# Patient Record
Sex: MALE | Race: WHITE
[De-identification: names, ages, dates, MRNs, and addresses within clinical notes are randomized per-mention and may not be internally consistent; named-entity substitution may affect disease eponyms.]

---

## 2017-01-16 ENCOUNTER — HOSPITAL ENCOUNTER (OUTPATIENT)
Dept: HOSPITAL 80 - FSGY | Age: 52
Discharge: HOME | End: 2017-01-16
Attending: ORTHOPAEDIC SURGERY
Payer: COMMERCIAL

## 2017-01-16 DIAGNOSIS — M75.22: ICD-10-CM

## 2017-01-16 DIAGNOSIS — M19.012: ICD-10-CM

## 2017-01-16 DIAGNOSIS — M75.82: ICD-10-CM

## 2017-01-16 DIAGNOSIS — I44.7: ICD-10-CM

## 2017-01-16 DIAGNOSIS — G56.02: ICD-10-CM

## 2017-01-16 DIAGNOSIS — I10: ICD-10-CM

## 2017-01-16 DIAGNOSIS — E11.9: ICD-10-CM

## 2017-01-16 DIAGNOSIS — M25.812: Primary | ICD-10-CM

## 2017-01-16 PROCEDURE — 0MN24ZZ RELEASE LEFT SHOULDER BURSA AND LIGAMENT, PERCUTANEOUS ENDOSCOPIC APPROACH: ICD-10-PCS | Performed by: ORTHOPAEDIC SURGERY

## 2017-01-16 PROCEDURE — C1713 ANCHOR/SCREW BN/BN,TIS/BN: HCPCS

## 2017-01-16 PROCEDURE — 0RBK4ZZ EXCISION OF LEFT SHOULDER JOINT, PERCUTANEOUS ENDOSCOPIC APPROACH: ICD-10-PCS | Performed by: ORTHOPAEDIC SURGERY

## 2017-01-16 PROCEDURE — 0MN64ZZ RELEASE LEFT WRIST BURSA AND LIGAMENT, PERCUTANEOUS ENDOSCOPIC APPROACH: ICD-10-PCS | Performed by: ORTHOPAEDIC SURGERY

## 2017-01-16 NOTE — GOP
[f rep st]



                                                                OPERATIVE REPORT





DATE OF OPERATION:  01/16/2017



SURGEON:  Holli Hernandez MD



ASSISTANT:  Khang Main CFA



ANESTHESIA:  General.



PREOPERATIVE DIAGNOSIS:  Impingement syndrome, biceps tendinitis, AC arthritis left shoulder, and car
pal tunnel syndrome left wrist.



POSTOPERATIVE DIAGNOSIS:  Impingement syndrome, biceps tendinitis, AC arthritis left shoulder, and ca
rpal tunnel syndrome left wrist, with chondral lesion humeral head left shoulder.



PROCEDURE PERFORMED:  Arthroscopy, arthroscopic debridement of superior labral anterior-posterior tea
r, with chondroplasty of the humeral head, debridement of subacromial impingement, subacromial decomp
ression, distal clavicle excision, biceps tenodesis left shoulder, and carpal tunnel release left wri
st.



FINDINGS:  A diagnostic arthroscopy of the left shoulder was performed with the following findings: 

1. The patient had normal articular cartilage on the majority of the glenoid.  There was a small area
 along the inferior aspect of the glenoid where there was some thinning of the cartilage, but no area
 of full-thickness loss.  

2. There was a chondral lesion on the humeral head, again inferiorly, which measured approximately 2 
cm in diameter.  

3. There was a large loose flap of articular cartilage which was debrided with the shaver and the bas
kets.  It was trimmed back to a stable rim.  

4. The remainder of the articular cartilage was normal.  

5. The area on the humeral head was not full thickness.  There was a remaining 1 or 2 mm of cartilage
 in the area of the chondral lesion.  

6. The rotator cuff was then inspected and the subscapularis was normal.  

7. The patient also had a fairly normal supraspinatus and infraspinatus.  There was marked inflammati
on in the shoulder and the rotator cuff.  

8. The labrum was normal inferiorly and posteriorly.  There was a SLAP tear superiorly from the 11 o'
clock to the 1 o'clock position which was debrided.  

9. The biceps tendon also was attenuated.  This was released for later tenodesis.  

10. The scope was then placed in the subacromial space and the patient was noted to have a very tight
 subacromial space with a marked subacromial bursitis.  This subacromial bursa was debrided.  

11. The rotator cuff was inspected from the bursal surface and noted to be intact.  

12. A subacromial decompression was performed, taking about 10 mm from the anterolateral acromion.  T
his decompressed the subacromial space nicely.  

13. The undersurface of the acromion was then smoothed with a rasp.  

14. The AC arthritis was addressed as well.  The distal 1 cm of the clavicle was excised.  There was 
complete loss of the articular cartilage on the end of the clavicle with a large inferior osteophyte.
  

15. The biceps tendon was then retrieved from the bicipital groove and a biceps tenodesis was perform
ed.  A drill hole was placed at the base of the groove and the tendon was inserted and fixated with a
 7 x 23 Bio-Tenodesis screw from [employer].  Excellent fixation of the tendon was obtained.  

16. Our attention was then turned to the carpal tunnel syndrome in the left wrist.  The transverse ca
rpal ligament was released in its entirety from proximal to distal.  The patient did have a significa
nt hourglass deformity of the median nerve and there was significant rebound hyperemia following rele
ase of the transverse carpal ligament.  It was released in its entirety and the median nerve was free
 in its course through the carpal canal following release of the ligament.



ESTIMATED BLOOD LOSS:  Minimal.



DESCRIPTION OF PROCEDURE:  



PROCEDURE:  The patient was taken the operating room, placed in supine position on the operating tabl
e.  Following induction of adequate general inhalation anesthesia, he was turned to a lateral decubit
us position on the beanbag and carefully padded.  His arm and shoulder were then prepped and draped i
n the usual sterile manner.  He received 2 g of IV Ancef.  The arm was placed in 10 pounds of longitu
dinal traction.  The arthroscope was introduced through a posterior portal and the instrumentation th
rough an anterior portal.  A diagnostic arthroscopy was performed with the above-noted findings.  Our
 attention was turned first to the pathology in the shoulder.  The SLAP tear was addressed with the s
haver.  It was debrided and the biceps tendon was released for a later tenodesis.  The synovitis in t
he shoulder was also debrided.  The chondral lesion on the inferior humeral head was then removed wit
h the baskets and the shaver.  It was trimmed back to a stable rim.  Once this was completed, the ins
trumentation was removed from the shoulder and placed in the subacromial space and a lateral working 
portal was created.  A subacromial decompression was performed.  The bur was utilized to perform a dee
bacromial decompression.  The decompression was carried across to the AC joint.  A rasp was then used
 to smooth the undersurface of the acromion.  The subacromial bursa was excised with a shaver.  The i
nstrumentation was removed from the shoulder and a 3 cm incision was made along the distal end of the
 clavicle.  The incision was carried down through the subcutaneous tissue to the AC joint.  The fasci
a overlying the AC joint was incised in a T-fashion and retracted and the distal 1 cm of the clavicle
 was excised.  The wound was irrigated out and the fascia was repaired using 0 Vicryl in interrupted 
fashion, the subcutaneous tissues were closed using 2-0 Vicryl, and the skin was closed using 4-0 Steve
ryl.  Steri-Strips were applied.  The patient was then turned to a beach chair position and a 4 cm in
cision was made along the bicipital groove.  The incision was carried down through the subcutaneous t
issue to the deltoid.  The deltoid was split in line with the incision and the box rim retractor was 
utilized to retract the deltoid.  The bicipital groove was located and the biceps tendon was retrieve
d.  The biceps was prepared for a tenodesis with a FiberLoop.  A drill hole was then placed at the ba
se of the bicipital groove and the tendon was inserted and fixated with a 7 x 23 Bio-Tenodesis screw.
  Excellent fixation was obtained.  The wound was irrigated out and closed using 2-0 Vicryl in the dee
bcutaneous tissue and 4-0 Vicryl in the skin.  Steri-Strips were applied.  Next, our attention was tu
rned to the carpal tunnel.  A sterile tourniquet was used on the forearm.  It was inflated to 250 mmH
g.  Incision was made extending along the thenar crease to the flexion crease of the wrist.  It was c
arried down through the subcutaneous tissue to the transverse carpal ligament.  A curved clamp was pl
aced under the ligament and then sharp dissection was performed through the ligament down to the carp
al canal.  Care was taken to protect the median nerve throughout the procedure.  The transverse carpa
l ligament was released in its entirety from proximal to distal.  The wound was then irrigated out an
d injected with 10 cc of plain 0.5% Marcaine.  The wound was closed using 3-0 Prolene in a horizontal
 mattress fashion.  Sterile dressings and a splint were applied.  The patient tolerated the procedure
 well and there were no complications.  Estimated blood loss minimal.  Final sponge and needle counts
 were correct.  The patient was transported to the recovery room in a good condition.





Job #:  [id number]

## 2017-05-18 ENCOUNTER — HOSPITAL ENCOUNTER (OUTPATIENT)
Dept: HOSPITAL 80 - FCATH | Age: 52
Discharge: HOME | End: 2017-05-18
Attending: INTERNAL MEDICINE
Payer: COMMERCIAL

## 2017-05-18 DIAGNOSIS — M85.819: ICD-10-CM

## 2017-05-18 DIAGNOSIS — I10: ICD-10-CM

## 2017-05-18 DIAGNOSIS — I20.9: Primary | ICD-10-CM

## 2017-05-18 DIAGNOSIS — E11.9: ICD-10-CM

## 2017-05-18 DIAGNOSIS — Z96.652: ICD-10-CM

## 2017-05-18 LAB
% IMMATURE GRANULYOCYTES: 0.4 % (ref 0–1.1)
ABSOLUTE IMMATURE GRANULOCYTES: 0.02 10^3/UL (ref 0–0.1)
ABSOLUTE NRBC COUNT: 0 10^3/UL (ref 0–0.01)
ADD DIFF?: NO
ADD MORPH?: NO
ADD SCAN?: NO
ANION GAP SERPL CALC-SCNC: 14 MEQ/L (ref 8–16)
ATYPICAL LYMPHOCYTE FLAG: 60 (ref 0–99)
CALCIUM SERPL-MCNC: 9.4 MG/DL (ref 8.5–10.4)
CHLORIDE SERPL-SCNC: 104 MEQ/L (ref 97–110)
CHOLEST SERPL-MCNC: 142 MG/DL (ref 140–220)
CHOLEST/HDLC SERPL: 5.26 RATIO (ref 1–4.97)
CO2 SERPL-SCNC: 21 MEQ/L (ref 22–31)
CREAT SERPL-MCNC: 0.9 MG/DL (ref 0.7–1.3)
ERYTHROCYTE [DISTWIDTH] IN BLOOD BY AUTOMATED COUNT: 12.2 % (ref 11.5–15.2)
FRAGMENT RBC FLAG: 0 (ref 0–99)
GFR SERPL CREATININE-BSD FRML MDRD: > 60 ML/MIN/{1.73_M2}
GLUCOSE SERPL-MCNC: 179 MG/DL (ref 70–100)
HCT VFR BLD CALC: 48.4 % (ref 40–51)
HGB BLD-MCNC: 17.3 G/DL (ref 13.7–17.5)
HIGH DENSITY LIPOPROTEIN: 27 MG/DL (ref 40–65)
INR PPP: 1.01 (ref 0.83–1.16)
LDLC/HDLC SERPL: 3.22 RATIO (ref 1–3.64)
LEFT SHIFT FLG: 0 (ref 0–99)
LIPEMIA HEMOLYSIS FLAG: 90 (ref 0–99)
LOW DENSITY LIPOPROTEIN: 87 MG/DL (ref 80–100)
MAGNESIUM SERPL-MCNC: 1.7 MG/DL (ref 1.6–2.3)
MCH RBC BLDCO QN: 30.9 PG (ref 27.9–34.1)
MCHC RBC AUTO-ENTMCNC: 35.7 G/DL (ref 32.4–36.7)
MCV RBC AUTO: 86.6 FL (ref 81.5–99.8)
NON-HIGH DENSITY LIPOPROTEIN: 115 MG/DL (ref 90–129)
NRBC-AUTO%: 0 % (ref 0–0.2)
PLATELET # BLD: 149 10^3/UL (ref 150–400)
PLATELET CLUMPS FLAG: 0 (ref 0–99)
PMV BLD AUTO: 9.5 FL (ref 8.7–11.7)
POTASSIUM SERPL-SCNC: 4.5 MEQ/L (ref 3.5–5.2)
PROTHROMBIN TIME: 13.2 SEC (ref 12–15)
RBC # BLD AUTO: 5.59 10^6/UL (ref 4.4–6.38)
SODIUM SERPL-SCNC: 139 MEQ/L (ref 134–144)
TRIGL SERPL-MCNC: 140 MG/DL (ref 40–150)
VERY LOW DENSITY LIPOPROTEINS: 28 MG/DL (ref 8–25)

## 2017-05-18 PROCEDURE — 4A023N7 MEASUREMENT OF CARDIAC SAMPLING AND PRESSURE, LEFT HEART, PERCUTANEOUS APPROACH: ICD-10-PCS | Performed by: INTERNAL MEDICINE

## 2017-05-18 PROCEDURE — B2111ZZ FLUOROSCOPY OF MULTIPLE CORONARY ARTERIES USING LOW OSMOLAR CONTRAST: ICD-10-PCS | Performed by: INTERNAL MEDICINE

## 2017-05-18 PROCEDURE — B2151ZZ FLUOROSCOPY OF LEFT HEART USING LOW OSMOLAR CONTRAST: ICD-10-PCS | Performed by: INTERNAL MEDICINE

## 2017-05-18 PROCEDURE — C1760 CLOSURE DEV, VASC: HCPCS

## 2017-05-18 NOTE — PDDXCAT
Diagnostic Cath Note





- .


Date: 05/18/17


: Abhi


Indication: Class III or IV angina, which improves to class I/II w medical 

therapy





- Procedure


Access: right groin


Procedure: left heart catheterization, coronary angiography, left ventriculogram





- Materials


Left Heart Cath size: 6F


Left Heart Cath materials: standard multipack (JL4, JR4, pigtail), other (Cook 

needle)





- Findings-Left Heart Catheterization


LM: Short vessel without appreciable luminal irregularities.  Bifurcation into 

the LAD and the LCX.


LAD: Large diameter vessel.  Principal Diag in the early part of the vessel.  

Vessel travels to the apex of the heart.  No luminal irregularities were noted


LCX: Dominant vessel with supply to the PDA region.  There was an early OM take 

off (principal OM) with bifurcation in the proximal portion of the branch 

vessel.  A smaller OM in the mid portion of the LCX was also noted, and equally 

large.  No luminal irregularities were noted.


RCA: Small, diminuative vessel.  No luminal irregularities were noted.


EDP: 12 mm Hg


LVEF: 55%


Wall motion: Normal wall motion


Complications: none


Estimated blood loss: <50ml


Closure method: Angioseal


Assessment: Patient is a 52 y/o male with symptoms concerning for ACS.  Recent 

MPI in early 2017 without ischaemia or infarction patterns noted.  Normal left 

ventricular systolic ejection fraction was noted.


Plan: 





Medical management is recommended for this patient.  Would continue with 

aggressive risk factor modification.


Intervention: 





None





Patient Problems: 


 Problems











Problem Status Onset


 


Arrhythmia Acute  


 


Localized primary osteoarthritis of left lower leg Acute

## 2017-05-18 NOTE — CPEKG
Heart Rate: 101

RR Interval: 594

P-R Interval: 176

QRSD Interval: 102

QT Interval: 336

QTC Interval: 436

P Axis: 43

QRS Axis: -30

T Wave Axis: 61

EKG Severity - ABNORMAL ECG -

EKG Impression: SINUS TACHYCARDIA

EKG Impression: MULTIPLE VENTRICULAR PREMATURE COMPLEXES

EKG Impression: LEFT AXIS DEVIATION

Electronically Signed By: Alaina Adams 18-May-2017 07:18:08

## 2017-11-13 ENCOUNTER — HOSPITAL ENCOUNTER (OUTPATIENT)
Dept: HOSPITAL 80 - FSGY | Age: 52
Discharge: HOME | End: 2017-11-13
Attending: ORTHOPAEDIC SURGERY
Payer: COMMERCIAL

## 2017-11-13 VITALS — OXYGEN SATURATION: 91 % | DIASTOLIC BLOOD PRESSURE: 97 MMHG | SYSTOLIC BLOOD PRESSURE: 136 MMHG

## 2017-11-13 VITALS — HEART RATE: 69 BPM | RESPIRATION RATE: 16 BRPM | TEMPERATURE: 97.9 F

## 2017-11-13 DIAGNOSIS — I10: ICD-10-CM

## 2017-11-13 DIAGNOSIS — M75.21: ICD-10-CM

## 2017-11-13 DIAGNOSIS — E29.1: ICD-10-CM

## 2017-11-13 DIAGNOSIS — Z96.652: ICD-10-CM

## 2017-11-13 DIAGNOSIS — E11.9: ICD-10-CM

## 2017-11-13 DIAGNOSIS — Z48.02: ICD-10-CM

## 2017-11-13 DIAGNOSIS — T81.89XA: ICD-10-CM

## 2017-11-13 DIAGNOSIS — M75.41: Primary | ICD-10-CM

## 2017-11-13 DIAGNOSIS — G56.01: ICD-10-CM

## 2017-11-13 DIAGNOSIS — M75.81: ICD-10-CM

## 2017-11-13 PROCEDURE — 01N50ZZ RELEASE MEDIAN NERVE, OPEN APPROACH: ICD-10-PCS | Performed by: ORTHOPAEDIC SURGERY

## 2017-11-13 PROCEDURE — 0PB94ZZ EXCISION OF RIGHT CLAVICLE, PERCUTANEOUS ENDOSCOPIC APPROACH: ICD-10-PCS | Performed by: ORTHOPAEDIC SURGERY

## 2017-11-13 PROCEDURE — 0MB14ZZ EXCISION OF RIGHT SHOULDER BURSA AND LIGAMENT, PERCUTANEOUS ENDOSCOPIC APPROACH: ICD-10-PCS | Performed by: ORTHOPAEDIC SURGERY

## 2017-11-13 PROCEDURE — 0LM10ZZ REATTACHMENT OF RIGHT SHOULDER TENDON, OPEN APPROACH: ICD-10-PCS | Performed by: ORTHOPAEDIC SURGERY

## 2017-11-13 PROCEDURE — 8E0YXY8 SUTURE REMOVAL FROM LOWER EXTREMITY: ICD-10-PCS | Performed by: ORTHOPAEDIC SURGERY

## 2017-11-13 RX ADMIN — Medication PRN MCG: at 13:54

## 2017-11-13 RX ADMIN — Medication PRN MCG: at 13:43

## 2017-11-13 NOTE — POSTOPPROG
Post Op Note


Date of Operation: 11/13/17


Surgeon: Payton Florian


Assistant: Marilynn Gallegos


Anesthesiologist: Dr. Garcia


Anesthesia: GET(General Endotracheal)


Pre-op Diagnosis: right shoulder pain, right carpal tunnel syndrome, painful 

sutures L knee


Post-op Diagnosis: R shoulder pain, R carpal tunnel syndrome, painful sutures L 

knee


Indication: continued pain


Procedure: R shoulder SAD, DCE, debridment, biceps tenotomy; R CTR; L knee 

suture ronaldo


Inf/Abcess present in the surg proc area at time of surgery?: No


EBL: Minimal


Complications: 





none

## 2017-11-13 NOTE — POSTANESTH
Post Anesthetic Evaluation


Cardiovascular Status: Similar to Pre-Op Cond


Respiratory Status: Similar to Pre-op Cond.


Level of Consciousness/Mental Status: Mildly Sleepy, Arousable


Pain Control: Adequate, Prn Tx Ordered


Nausea/Vomiting Control: Adequate, Prn Tx Ordered


Complications Possibly Related to Anesthesia: None Noted

## 2017-11-13 NOTE — SOAPPROG
SOAP Progress Note


Assessment/Plan: 


Assessment/Plan:





53y/o male s/p right shoulder SAD, DCE, debridement, biceps tenotomy; right 

carpal tunnel release; removal of painful sutures in left knee


- stable and doing well


- sling x 2 weeks, ok for pendulums and straightening elbow


- meds as directed


- keep incisions clean and dry


- call with issues or concerns

















11/13/17 13:54





Subjective: 





Shoulder feels sore, otherwise well


Objective: 





 Vital Signs











Temp Pulse Resp BP Pulse Ox


 


 36.4 C   82   14   140/81 H  94 


 


 11/13/17 13:30  11/13/17 10:05  11/13/17 10:05  11/13/17 10:05  11/13/17 10:05








NAD, well appearing, no distress


EOMi, face symmetric


MAEx4


incisions clean, dressed





ICD10 Worksheet


Patient Problems: 


 Problems











Problem Status Onset


 


Shoulder pain Acute  














- ICD10 Problem Qualifiers


(1) Shoulder pain

## 2017-11-13 NOTE — PDANEPAE
ANE Past Medical History





- Cardiovascular History


Hx Hypertension: No


Hx Arrhythmias: No


Hx Chest Pain: No


Hx Coronary Artery / Peripheral Vascular Disease: No


Hx CHF / Valvular Disease: No


Hx Palpitations: No


Cardiovascular History Comment: RBBB





- Pulmonary History


Hx COPD: No


Hx Asthma/Reactive Airway Disease: No


Hx Recent Upper Respiratory Infection: No


Hx Oxygen in Use at Home: No


Hx Sleep Apnea: No


Sleep Apnea Screening Result - Last Documented: Negative





- Neurologic History


Hx Cerebrovascular Accident: No


Hx Seizures: No


Hx Dementia: No





- Endocrine History


Hx Diabetes: Yes


Obesity: severe


Endocrine History Comment: IDDM





- Renal History


Hx Renal Disorders: No





- Liver History


Hx Hepatic Disorders: No





- Neurological & Psychiatric Hx


Hx Neurological and Psychiatric Disorders: No





- Cancer History


Hx Cancer: No





- Congenital Disorder History


Hx Congenital Disorders: No





- GI History


Hx Gastrointestinal Disorders: No





- Other Health History


Other Health History: INTERMITTENT RT BICEPS STRESS RASH





- Chronic Pain History


Chronic Pain: Yes (RT SHLDR)





- Surgical History


Prior Surgeries: LT SHLDR RTC 1/2017 AT.  Left knee replaced.  Tonsilectomy.  

Vasectomy.  Umbilical hernia.  Right heel bone spur





ANE Review of Systems


Review of Systems: 








- Exercise capacity


METS (RN): 4 METS





ANE Patient History





- Allergies


Allergies/Adverse Reactions: 








exenatide microspheres [From Bydureon] Allergy (Verified 11/08/16 16:09)


 


sulfamethoxazole [From Bactrim] Allergy (Verified 10/10/17 15:33)


 


testosterone [From Testim] Allergy (Verified 10/10/17 15:33)


 Rash


trimethoprim [From Bactrim] Allergy (Verified 10/10/17 15:33)


 FEVER,DECREASED BP








- Home Medications


Home Medications: 








GABAPENTIN 900 mg PO DAILY AT 6PM 11/08/16 [Last Taken Unknown]


Lisinopril 10 mg PO DAILY AT 6PM 11/08/16 [Last Taken Unknown]


Metformin HCl [Metformin 1000 mg] DAILY AT 6PM 11/08/16 [Last Taken Unknown]


Nesina 25 DAILY AT 6PM 11/08/16 [Last Taken Unknown]


Advil Pm Caplet HS 10/10/17 [Last Taken Unknown]


Allergy Medication DAILY AT 8PM 10/10/17 [Last Taken Unknown]


Aspirin DAILY AT 6PM 10/10/17 [Last Taken Unknown]


Basaglar DAILY AT 8PM 10/10/17 [Last Taken Unknown]


Herbal Drugs  10/10/17 [Last Taken Unknown]


traMADol PRN 10/10/17 [Last Taken Unknown]








- NPO status


NPO Since - Liquids (Date): 11/12/17


NPO Since - Liquids (Time): 23:00


NPO Since - Solids (Date): 11/12/17


NPO Since - Solids (Time): 22:30





- Smoking Hx


Smoking Status: Never smoked





- Family Anes Hx


Family Hx Anesthesia Complications: None





ANE Labs/Vital Signs





- Vital Signs


Blood Pressure: 140/81


Heart Rate: 82


Respiratory Rate: 14


O2 Sat (%): 94


Height: 185.42 cm


Weight: 120.202 kg





ANE Physical Exam





- Airway


Neck exam: FROM


Mallampati Score: Class 1


Mouth exam: normal dental/mouth exam





- Pulmonary


Pulmonary: no respiratory distress





- Cardiovascular


Cardiovascular: regular rate and rhythym





- ASA Status


ASA Status: III





ANE Anesthesia Plan


Anesthesia Plan: general endotracheal anesthesia

## 2019-01-17 ENCOUNTER — HOSPITAL ENCOUNTER (OUTPATIENT)
Dept: HOSPITAL 80 - FSGY | Age: 54
Discharge: HOME | End: 2019-01-17
Attending: ORTHOPAEDIC SURGERY
Payer: COMMERCIAL

## 2019-01-17 VITALS — SYSTOLIC BLOOD PRESSURE: 135 MMHG | DIASTOLIC BLOOD PRESSURE: 80 MMHG

## 2019-01-17 DIAGNOSIS — G56.21: Primary | ICD-10-CM

## 2019-01-17 PROCEDURE — 01N40ZZ RELEASE ULNAR NERVE, OPEN APPROACH: ICD-10-PCS | Performed by: ORTHOPAEDIC SURGERY

## 2019-01-17 RX ADMIN — HYDROMORPHONE HYDROCHLORIDE PRN MG: 2 INJECTION INTRAMUSCULAR; INTRAVENOUS; SUBCUTANEOUS at 10:23

## 2019-01-17 RX ADMIN — Medication PRN MCG: at 10:20

## 2019-01-17 RX ADMIN — HYDROMORPHONE HYDROCHLORIDE PRN MG: 2 INJECTION INTRAMUSCULAR; INTRAVENOUS; SUBCUTANEOUS at 10:36

## 2019-01-17 RX ADMIN — Medication PRN MCG: at 10:36

## 2019-01-17 NOTE — SOAPPROG
SOAP Progress Note


Assessment/Plan: 


Assessment:

















Assessment/Plan:


54y/o male s/p right ulnar nerve decompression/transposition


- stable and doing well


- sling and splint at all times x 2 weeks


- dc when PACU criteria met


- call with any issues





01/17/19 10:22





Subjective: 





Elbow pain 6/10. Minimal tingling in 5th digit


Objective: 





 Vital Signs











Temp Pulse Resp BP Pulse Ox


 


 36.5 C   79   15   129/85 H  95 


 


 01/17/19 06:26  01/17/19 07:11  01/17/19 07:11  01/17/19 07:11  01/17/19 07:11








NAD, no distress, waking from anesthesia


EOMi, face symmetric


wiggles all fingers easily on the right


incision clean, dressed





ICD10 Worksheet


Patient Problems: 


 Problems











Problem Status Onset


 


Arrhythmia Acute  


 


Localized primary osteoarthritis of left lower leg Acute  


 


Shoulder pain Acute

## 2019-01-17 NOTE — POSTOPPROG
Post Op Note


Date of Operation: 01/17/19


Surgeon: Payton Florian


Assistant: Marilynn Gallegos PA-C


Anesthesiologist: Dr. Bustos


Anesthesia: GET(General Endotracheal)


Pre-op Diagnosis: right ulnar neuritis


Post-op Diagnosis: right ulnar nerve entrapment


Indication: right ulnar nerve pain


Procedure: right ulnar nerve decompression/transposition


Inf/Abcess present in the surg proc area at time of surgery?: No


EBL: Minimal


Complications: 





none

## 2019-01-17 NOTE — PDANEPAE
ANE Past Medical History





- Cardiovascular History


Hx Hypertension: Yes


Hx Arrhythmias: No


Hx Chest Pain: Yes


Hx Coronary Artery / Peripheral Vascular Disease: No


Hx CHF / Valvular Disease: No


Hx Palpitations: No


Cardiovascular History Comment: Hx LBBB.  Heart cath 5/2017





- Pulmonary History


Hx COPD: No


Hx Asthma/Reactive Airway Disease: No


Hx Recent Upper Respiratory Infection: No


Hx Oxygen in Use at Home: No


Hx Sleep Apnea: No


Sleep Apnea Screening Result - Last Documented: Positive


Pulmonary History Comment: AYDIN triggers





- Neurologic History


Hx Cerebrovascular Accident: No


Hx Seizures: No


Hx Dementia: No





- Endocrine History


Hx Diabetes: Yes


Endocrine History Comment: INSULIN DEP. DX 2013





- Renal History


Hx Renal Disorders: No





- Liver History


Hx Hepatic Disorders: No





- Neurological & Psychiatric Hx


Hx Neurological and Psychiatric Disorders: No





- Cancer History


Hx Cancer: No





- Congenital Disorder History


Hx Congenital Disorders: No





- GI History


Hx Gastrointestinal Disorders: No





- Other Health History


Other Health History: wears glasses for reading.  osteoarthritis - uses 

marijuana for pain control





- Chronic Pain History


Chronic Pain: Yes (kenns, shoulders)





- Surgical History


Prior Surgeries: left shoulder replacement and carpel tunnel 2017.  right 

shoulder clean-up and carpel tunnel 2017.  left total knee 11/15.  LT KNEE 

SCOPE.  REMVL BONE SPUR RT HEEL.  UMBILICAL HERNIA.  TONSILLECTOMY





ANE Review of Systems


Review of Systems: 








- Exercise capacity


METS (RN): 4 METS





ANE Patient History





- Allergies


Allergies/Adverse Reactions: 








dapagliflozin [From Farxiga] Allergy (Verified 01/07/19 11:07)


 stomach pain


exenatide microspheres [From Bydureon] Allergy (Verified 01/07/19 11:07)


 stomach pain


sulfamethoxazole [From Bactrim] Allergy (Verified 01/07/19 11:07)


 fever, hypotension


testosterone [From Testim] Allergy (Verified 10/10/17 15:33)


 Rash


trimethoprim [From Bactrim] Allergy (Verified 01/07/19 11:07)


 fever, hypotension








- Home Medications


Home medications: home medication list seen and reviewed


Home Medications: 








Lisinopril [Zestril 10 mg (*)] 10 mg HS 10/29/15 [Last Taken 01/16/19]


metFORMIN HCL [Glucophage 500 mg (*)] 1,000 mg HS 10/29/15 [Last Taken 01/16/19]


Gabapentin [Neurontin 300 MG (*)] 900 mg HS 11/17/16 [Last Taken 01/16/19]


Alogliptin Benzoate [Nesina] 10 mg HS 01/07/19 [Last Taken 01/16/19]


Aspirin [Aspirin 81mg (*)] 81 mg DAILY 01/07/19 [Last Taken 01/10/19]


Insulin Glargine [Lantus] 40 unit HS 01/07/19 [Last Taken 01/16/19]


Lactobacillus Acidophilus [Probiotic] 1 each PO 01/07/19 [Last Taken 01/10/19]


Multivitamin [One-Daily Multi-Vitamin] 1 each PO 01/07/19 [Last Taken 01/10/19]








- NPO status


NPO Status: no food or drink >8 hours


NPO Since - Liquids (Date): 01/17/19


NPO Since - Liquids (Time): 04:15


NPO Since - Solids (Date): 01/16/19


NPO Since - Solids (Time): 20:00





- Anes Hx


Anes Hx: post operative nausea and vomiting





- Smoking Hx


Smoking Status: Never smoked





- Family Anes Hx


Family Hx Anesthesia Complications: unknown, pt is adopted





ANE Labs/Vital Signs





- Vital Signs


Blood Pressure: 129/85


Heart Rate: 79


Respiratory Rate: 15


O2 Sat (%): 95


Height: 185.42 cm


Weight: 115.666 kg





ANE Physical Exam





- Airway


Neck exam: FROM


Mallampati Score: Class 3





- Pulmonary


Pulmonary: no respiratory distress, no rales or rhonchi, clear to auscultation





- Cardiovascular


Cardiovascular: regular rate and rhythym, no murmur, rub, or gallop





- ASA Status


ASA Status: II





ANE Anesthesia Plan


Anesthesia Plan: general endotracheal anesthesia

## 2019-01-18 NOTE — GOP
DATE OF OPERATION:  01/17/2019



SURGEON:  Payton Florian MD



ASSISTANT:  VAHID Farrar.



ANESTHESIA:  General.



PREOPERATIVE DIAGNOSIS:  Cubital tunnel syndrome, right elbow.



POSTOPERATIVE DIAGNOSIS:  Cubital tunnel syndrome, right elbow.



PROCEDURE PERFORMED:  Decompression of ulnar nerve at the elbow with subcutaneous transposition of th
e ulnar nerve.



FINDINGS:  Preoperative exam and EMG nerve conduction study demonstrated significant cubital tunnel s
yndrome of the right elbow.  At the time of surgery, the ulnar nerve was located in the cubital tunne
l with some difficulty.  The patient had an anomalous triceps muscle which extended down into the cub
ital tunnel.  This was teased back and the ulnar nerve was located just posterior to the medial inter
muscular septum.  The nerve was then traced through its course in the cubital tunnel.  There was sign
ificant compression on the nerve.  There was an hourglass deformity starting at the superior portion 
of the tunnel and extending through the area of compression in the cubital tunnel.  The nerve was mob
ilized and in addition to the vascular bundle that traveled with the nerve, it was transposed medial 
to the medial epicondyle.  The nerve was completely released distally as well as it entered the forea
rm.  Once the nerve was transposed anterior to the medial upper condyle, some 0 Vicryl was used to ta
ck the subcutaneous tissue to the medial epicondyle to hold the nerve in this position.  There was no
 tension on the nerve proximal or distal.





ESTIMATED BLOOD LOSS:  Minimal.



DESCRIPTION OF PROCEDURE:  The patient was taken to the operating room, placed in supine position on 
the operating table.  Following induction of adequate general inhalation anesthesia, the arm and fore
arm were prepped and draped in usual sterile manner.  The patient received 2 g of IV Ancef.  The arm 
was elevated and exsanguinated and the tourniquet inflated to 250 mmHg.  A curvilinear incision was m
anjana posterior to the medial epicondyle.  Incision was carried down through the subcutaneous fat layer
 to the cubital tunnel.  The patient's anatomy was somewhat distorted by the extension of his triceps
 both distally and anteriorly.  After careful dissection, the ulnar nerve was located just posterior 
to the medial intermuscular septum.  It was carefully dissected free in its course through the cubita
l tunnel.  The dissection was carried distally as well.  The nerve was then mobilized to the point wh
ere there was no tension on it proximally or distally, and it was transposed in the subcutaneous tiss
ue anteriorly.  The 0 Vicryl was used to tack the subcutaneous layer to the medial epicondyle to hold
 the nerve in the transposed position.  The subcutaneous tissue was closed using 2-0 Vicryl.  The ski
n was closed using 4-0 Vicryl in a running subcuticular fashion.  Steri-Strips and sterile dressings 
were applied.  The patient was placed in a posterior splint.  He tolerated the procedure well.  There
 were no complications.



ESTIMATED BLOOD LOSS:  Minimal. 



Sponge and instrument counts were correct.  The patient was transported to recovery room in good cond
ition.





Job #:  [id number]